# Patient Record
Sex: MALE | Race: WHITE | URBAN - METROPOLITAN AREA
[De-identification: names, ages, dates, MRNs, and addresses within clinical notes are randomized per-mention and may not be internally consistent; named-entity substitution may affect disease eponyms.]

---

## 2017-08-15 ENCOUNTER — RADIANT APPOINTMENT (OUTPATIENT)
Dept: GENERAL RADIOLOGY | Facility: CLINIC | Age: 27
End: 2017-08-15
Attending: PHYSICIAN ASSISTANT
Payer: COMMERCIAL

## 2017-08-15 ENCOUNTER — OFFICE VISIT (OUTPATIENT)
Dept: FAMILY MEDICINE | Facility: CLINIC | Age: 27
End: 2017-08-15
Payer: COMMERCIAL

## 2017-08-15 VITALS
TEMPERATURE: 97.6 F | HEART RATE: 64 BPM | OXYGEN SATURATION: 100 % | SYSTOLIC BLOOD PRESSURE: 128 MMHG | RESPIRATION RATE: 16 BRPM | HEIGHT: 76 IN | DIASTOLIC BLOOD PRESSURE: 70 MMHG | BODY MASS INDEX: 23.62 KG/M2 | WEIGHT: 194 LBS

## 2017-08-15 DIAGNOSIS — R23.8 PIMPLES: ICD-10-CM

## 2017-08-15 DIAGNOSIS — Z23 NEED FOR VACCINATION: ICD-10-CM

## 2017-08-15 DIAGNOSIS — L08.9 LOCAL INFECTION OF SKIN AND SUBCUTANEOUS TISSUE: ICD-10-CM

## 2017-08-15 DIAGNOSIS — S99.929A: ICD-10-CM

## 2017-08-15 DIAGNOSIS — S90.229A CONTUSION OF TOENAIL, UNSPECIFIED LATERALITY, INITIAL ENCOUNTER: ICD-10-CM

## 2017-08-15 DIAGNOSIS — S99.929A: Primary | ICD-10-CM

## 2017-08-15 PROCEDURE — 90471 IMMUNIZATION ADMIN: CPT | Performed by: PHYSICIAN ASSISTANT

## 2017-08-15 PROCEDURE — 90714 TD VACC NO PRESV 7 YRS+ IM: CPT | Performed by: PHYSICIAN ASSISTANT

## 2017-08-15 PROCEDURE — 99202 OFFICE O/P NEW SF 15 MIN: CPT | Mod: 25 | Performed by: PHYSICIAN ASSISTANT

## 2017-08-15 PROCEDURE — 73630 X-RAY EXAM OF FOOT: CPT | Mod: LT

## 2017-08-15 PROCEDURE — 11740 EVACUATION SUBUNGUAL HMTMA: CPT | Mod: 59 | Performed by: PHYSICIAN ASSISTANT

## 2017-08-15 RX ORDER — CEPHALEXIN 500 MG/1
500 CAPSULE ORAL 4 TIMES DAILY
Qty: 40 CAPSULE | Refills: 0 | Status: SHIPPED | OUTPATIENT
Start: 2017-08-15 | End: 2018-12-12

## 2017-08-15 RX ORDER — HYDROCODONE BITARTRATE AND ACETAMINOPHEN 5; 325 MG/1; MG/1
1 TABLET ORAL EVERY 6 HOURS PRN
Qty: 12 TABLET | Refills: 0 | Status: SHIPPED | OUTPATIENT
Start: 2017-08-15 | End: 2018-12-12

## 2017-08-15 NOTE — MR AVS SNAPSHOT
After Visit Summary   8/15/2017    Juan Robertson    MRN: [id number]           Patient Information     Date Of Birth          1990        Visit Information        Provider Department      8/15/2017 6:40 PM Fernanda Malcolm PA-C [facility] [place]        Today's Diagnoses     Toe trauma, unspecified laterality, initial encounter    -  1    Need for vaccination        Local infection of skin and subcutaneous tissue          Care Instructions      Cellulitis  Cellulitis is an infection of the deep layers of skin. A break in the skin, such as a cut or scratch, can let bacteria under the skin. If the bacteria get to deep layers of the skin, it can be serious. If not treated, cellulitis can get into the bloodstream and lymph nodes. The infection can then spread throughout the body. This causes serious illness.  Cellulitis causes the affected skin to become red, swollen, warm, and sore. The reddened areas have a visible border. An open sore may leak fluid (pus). You may have a fever, chills, and pain.  Cellulitis is treated with antibiotics taken for 7 to 10 days. An open sore may be cleaned and covered with cool wet gauze. Symptoms should get better 1 to 2 days after treatment is started. Make sure to take all the antibiotics for the full number of days until they are gone. Keep taking the medicine even if your symptoms go away.  Home care  Follow these tips:    Limit the use of the part of your body with cellulitis.     If the infection is on your leg, keep your leg raised while sitting. This will help to reduce swelling.    Take all of the antibiotic medicine exactly as directed until it is gone. Do not miss any doses, especially during the first 7 days. Don t stop taking the medicine when your symptoms get better.    Keep the affected area clean and dry.    Wash your hands with soap and warm water before and after touching your skin. Anyone else who touches your skin should also wash  "his or her hands. Don't share towels.  Follow-up care  Follow up with your healthcare provider, or as advised. If your infection does not go away on the first antibiotic, your healthcare provider will prescribe a different one.  When to seek medical advice  Call your healthcare provider right away if any of these occur:    Red areas that spread    Swelling or pain that gets worse    Fluid leaking from the skin (pus)    Fever higher of 100.4  F (38.0  C) or higher after 2 days on antibiotics  Date Last Reviewed: 9/1/2016 2000-2017 [employer]. [address] [place] [address]. All rights reserved. This information is not intended as a substitute for professional medical care. Always follow your healthcare professional's instructions.                Follow-ups after your visit        Who to contact     If you have questions or need follow up information about today's clinic visit or your schedule please contact [facility] [place] directly at [phone].  Normal or non-critical lab and imaging results will be communicated to you by SamEnricohart, letter or phone within 4 business days after the clinic has received the results. If you do not hear from us within 7 days, please contact the clinic through SamEnricohart or phone. If you have a critical or abnormal lab result, we will notify you by phone as soon as possible.  Submit refill requests through Heysan or call your pharmacy and they will forward the refill request to us. Please allow 3 business days for your refill to be completed.          Additional Information About Your Visit        Heysan Information     Heysan lets you send messages to your doctor, view your test results, renew your prescriptions, schedule appointments and more. To sign up, go to [email or web address] . Click on \"Log in\" on the left side of the screen, which will take you to the Welcome page. Then click on \"Sign up Now\" on the right side of the page.     You " "will be asked to enter the access code listed below, as well as some personal information. Please follow the directions to create your username and password.     Your access code is: [email or web address]  Expires: 2017  7:25 PM     Your access code will  in 90 days. If you need help or a new code, please call your [facility] clinic or [phone].        Care EveryWhere ID     This is your Care EveryWhere ID. This could be used by other organizations to access your [facility] medical records  [id number]        Your Vitals Were     Pulse Temperature Respirations Height Pulse Oximetry BMI (Body Mass Index)    64 97.6  F (36.4  C) 16 6' 4\" (1.93 m) 100% 23.61 kg/m2       Blood Pressure from Last 3 Encounters:   08/15/17 128/70    Weight from Last 3 Encounters:   08/15/17 194 lb (88 kg)              We Performed the Following     1st  Administration  [[id number]]     TD PRSERV FREE >=7 YRS ADS IM [38149]          Today's Medication Changes          These changes are accurate as of: 8/15/17  7:25 PM.  If you have any questions, ask your nurse or doctor.               Start taking these medicines.        Dose/Directions    cephALEXin 500 MG capsule   Commonly known as:  KEFLEX   Used for:  Local infection of skin and subcutaneous tissue   Started by:  Fernanda Malcolm PA-C        Dose:  500 mg   Take 1 capsule (500 mg) by mouth 4 times daily   Quantity:  40 capsule   Refills:  0       HYDROcodone-acetaminophen 5-325 MG per tablet   Commonly known as:  NORCO   Used for:  Toe trauma, unspecified laterality, initial encounter   Started by:  Fernanda Malcolm PA-C        Dose:  1 tablet   Take 1 tablet by mouth every 6 hours as needed for moderate to severe pain maximum 4 tablet(s) per day   Quantity:  12 tablet   Refills:  0            Where to get your medicines      These medications were sent to [facility]/pharmacy #[id number] - [place] - [address] [facility]  [address] [address]     Phone: "  [phone]     cephALEXin 500 MG capsule         Some of these will need a paper prescription and others can be bought over the counter.  Ask your nurse if you have questions.     Bring a paper prescription for each of these medications     HYDROcodone-acetaminophen 5-325 MG per tablet                Primary Care Provider Office Phone # Fax #    Fernanda Malcolm PA-C [phone]       [address]  [place] [address]        Equal Access to Services     [facility] DELGADO : Hadii aad ku hadasho Soomaali, waaxda luqadaha, qaybta kaalmada adeegyada, waxay idiin hayaan adeeg khararos lafabio . So [facility] [phone].    ATENCIÓN: Si habla español, tiene a goncalves disposición servicios gratuitos de asistencia lingüística. Llame al [phone].    We comply with applicable federal civil rights laws and [place] laws. We do not discriminate on the basis of race, color, national origin, age, disability sex, sexual orientation or gender identity.            Thank you!     Thank you for choosing [facility] [place]  for your care. Our goal is always to provide you with excellent care. Hearing back from our patients is one way we can continue to improve our services. Please take a few minutes to complete the written survey that you may receive in the mail after your visit with us. Thank you!             Your Updated Medication List - Protect others around you: Learn how to safely use, store and throw away your medicines at www.disposemymeds.org.          This list is accurate as of: 8/15/17  7:25 PM.  Always use your most recent med list.                   Brand Name Dispense Instructions for use Diagnosis    cephALEXin 500 MG capsule    KEFLEX    40 capsule    Take 1 capsule (500 mg) by mouth 4 times daily    Local infection of skin and subcutaneous tissue       HYDROcodone-acetaminophen 5-325 MG per tablet    NORCO    12 tablet    Take 1 tablet by mouth every 6 hours as needed for moderate  to severe pain maximum 4 tablet(s) per day    Toe trauma, unspecified laterality, initial encounter

## 2017-08-15 NOTE — PROGRESS NOTES
"Chief Complaint   Patient presents with     Toe Pain     Mouth Problem       Initial /70  Pulse 64  Temp 97.6  F (36.4  C)  Resp 16  Ht 6' 4\" (1.93 m)  Wt 194 lb (88 kg)  SpO2 100%  BMI 23.61 kg/m2 Estimated body mass index is 23.61 kg/(m^2) as calculated from the following:    Height as of this encounter: 6' 4\" (1.93 m).    Weight as of this encounter: 194 lb (88 kg).  Medication Reconciliation: complete. KRYSTEN Oneill LPN        SUBJECTIVE:                                                    Juan Robertson is a 26 year old male who presents to clinic today for the following health issues:      Concern - #1 Rt 3rd toe and Left great toe pain  Onset: at music festival this weekend feet stepped on several  times    Description:   Bruising/red/pain    Intensity: 10/10 throbbing    Progression of Symptoms:  worsening    #2 sores by mouth - gets often       -------------------------------------    Problem list and histories reviewed & adjusted, as indicated.  Additional history: as documented    There is no problem list on file for this patient.    No past surgical history on file.    Social History   Substance Use Topics     Smoking status: Never Smoker     Smokeless tobacco: Never Used     Alcohol use Yes      Comment: 10-15/ month     Family History   Problem Relation Age of Onset     Family History Negative Mother      Liver Disease Father      transplant     Heart Defect Father      valve         Current Outpatient Prescriptions   Medication Sig Dispense Refill     cephALEXin (KEFLEX) 500 MG capsule Take 1 capsule (500 mg) by mouth 4 times daily 40 capsule 0     HYDROcodone-acetaminophen (NORCO) 5-325 MG per tablet Take 1 tablet by mouth every 6 hours as needed for moderate to severe pain maximum 4 tablet(s) per day 12 tablet 0     No Known Allergies  Labs reviewed in EPIC      Reviewed and updated as needed this visit by clinical staffTobacco  Allergies  Meds  Fam Hx  Soc Hx      Reviewed and updated as " "needed this visit by Provider         Social History     Social History     Marital status: Single     Spouse name: single     Number of children: 0     Years of education: N/A     Occupational History           Social History Main Topics     Smoking status: Never Smoker     Smokeless tobacco: Never Used     Alcohol use Yes      Comment: 10-15/ month     Drug use: No     Sexual activity: Yes     Partners: Female     Other Topics Concern     None     Social History Narrative     None       10 point review of systems negative other than symptoms noted above.   Constitutional, HEENT, CV, pulmonary, GI, , MS, Endo, Psych systems are all negative, except as otherwise noted.       OBJECTIVE:  /70  Pulse 64  Temp 97.6  F (36.4  C)  Resp 16  Ht 6' 4\" (1.93 m)  Wt 194 lb (88 kg)  SpO2 100%  BMI 23.61 kg/m2  CONSTITUTIONAL: Alert, well-nourished, well-groomed, NAD  RESP: Lungs CTA. No wheeze, rhonchi, rales. Normal effort on room air. Equal lung sounds bilaterally.   CV: HRRR, normal S1, S2. No MRG. No peripheral edema.  DERM: small pustular pimple above left upper lip border   MUSCULOSKELETAL:   BILATERAL FOOT EXAM:  Appearance: Left great toenail with traumatic hematoma underneath and surrounding erythema extending slightly proximally.   Right 3rd toenail is displaced and with a traumatic hematoma and blister under nail and surrounding. Erythema extending same as above from this toe. Also some erythema on distal 2nd toe.   Palpation: No palpable abnormalities.  Tenderness: above toes over nails and around. None otherwise.   Movement: Full range of motion intact.  Circulation: Distal circulation intact, pulses palpable and good capillary refill.  Strength: difficult forced flexion and extension of Left toe #1 and Right toe #3  Sensation: Sensation grossly intact.    PROCEDURE:  Written consent obtained.   Toes and nails cleansed with alcohol prep.   Using cautery pen, small hole was made in top of " nail near proximal edge to drain blood and serous fluid. Able to drain quite a bit, patient tolerated well and started to feel better. Discussed home cares and signs of infection. See below.     Diagnostic Tests:  XR FOOT BILATERAL G/E 3 VW 8/15/2017 7:13 PM     COMPARISON: None.     HISTORY: Left great toe and right fourth toe trauma.     FINDINGS:  Right foot: No fractures are seen. Joints are preserved and in normal  alignment.     Left foot: No fractures are seen. Joints are preserved and in normal  alignment.         IMPRESSION: No acute bone abnormality or significant degenerative  change identified in either foot.    ASSESSMENT/PLAN:  (S99.929A) Toe trauma, unspecified laterality, initial encounter  (primary encounter diagnosis)  Comment: Fractures r/o'd.   Plan: XR Foot Bilateral G/E 3 Views,         HYDROcodone-acetaminophen (NORCO) 5-325 MG per         tablet, EVACUATION BLOOD FROM UNDER NAIL,         EVACUATION BLOOD FROM UNDER NAIL            (Z23) Need for vaccination  Comment:   Plan: TD PRSERV FREE >=7 YRS ADS IM [13179], 1st          Administration  [93778]            (L08.9) Local infection of skin and subcutaneous tissue  Comment:   Plan: cephALEXin (KEFLEX) 500 MG capsule            (S90.229A) Contusion of toenail, unspecified laterality, initial encounter  Comment:   Plan: EVACUATION BLOOD FROM UNDER NAIL, EVACUATION         BLOOD FROM UNDER NAIL            (R23.8) Pimples  Comment:   Plan: Not a cold sore. Recommend good hygiene cares.       FOLLOW-UP: Routinely and sooner as needed. Will call to check in, in a few days.  The patient agrees with this assessment and plan and agrees to call or return to the clinic with any questions or concerns or if their condition worsens.    KEENAN Ramires, PA-C  [facility]

## 2017-08-16 NOTE — PATIENT INSTRUCTIONS
Cellulitis  Cellulitis is an infection of the deep layers of skin. A break in the skin, such as a cut or scratch, can let bacteria under the skin. If the bacteria get to deep layers of the skin, it can be serious. If not treated, cellulitis can get into the bloodstream and lymph nodes. The infection can then spread throughout the body. This causes serious illness.  Cellulitis causes the affected skin to become red, swollen, warm, and sore. The reddened areas have a visible border. An open sore may leak fluid (pus). You may have a fever, chills, and pain.  Cellulitis is treated with antibiotics taken for 7 to 10 days. An open sore may be cleaned and covered with cool wet gauze. Symptoms should get better 1 to 2 days after treatment is started. Make sure to take all the antibiotics for the full number of days until they are gone. Keep taking the medicine even if your symptoms go away.  Home care  Follow these tips:    Limit the use of the part of your body with cellulitis.     If the infection is on your leg, keep your leg raised while sitting. This will help to reduce swelling.    Take all of the antibiotic medicine exactly as directed until it is gone. Do not miss any doses, especially during the first 7 days. Don t stop taking the medicine when your symptoms get better.    Keep the affected area clean and dry.    Wash your hands with soap and warm water before and after touching your skin. Anyone else who touches your skin should also wash his or her hands. Don't share towels.  Follow-up care  Follow up with your healthcare provider, or as advised. If your infection does not go away on the first antibiotic, your healthcare provider will prescribe a different one.  When to seek medical advice  Call your healthcare provider right away if any of these occur:    Red areas that spread    Swelling or pain that gets worse    Fluid leaking from the skin (pus)    Fever higher of 100.4  F (38.0  C) or higher after 2 days  on antibiotics  Date Last Reviewed: 9/1/2016 2000-2017 [employer]. [address] [place] [address], [place] [address]. All rights reserved. This information is not intended as a substitute for professional medical care. Always follow your healthcare professional's instructions.

## 2017-08-22 ENCOUNTER — TELEPHONE (OUTPATIENT)
Dept: FAMILY MEDICINE | Facility: CLINIC | Age: 27
End: 2017-08-22

## 2018-12-12 ENCOUNTER — OFFICE VISIT (OUTPATIENT)
Dept: INTERNAL MEDICINE | Facility: CLINIC | Age: 28
End: 2018-12-12
Payer: COMMERCIAL

## 2018-12-12 ENCOUNTER — TRANSFERRED RECORDS (OUTPATIENT)
Dept: HEALTH INFORMATION MANAGEMENT | Facility: CLINIC | Age: 28
End: 2018-12-12

## 2018-12-12 ENCOUNTER — TELEPHONE (OUTPATIENT)
Dept: INTERNAL MEDICINE | Facility: CLINIC | Age: 28
End: 2018-12-12

## 2018-12-12 VITALS
OXYGEN SATURATION: 98 % | RESPIRATION RATE: 18 BRPM | TEMPERATURE: 98.2 F | BODY MASS INDEX: 22.87 KG/M2 | DIASTOLIC BLOOD PRESSURE: 68 MMHG | SYSTOLIC BLOOD PRESSURE: 128 MMHG | HEIGHT: 76 IN | WEIGHT: 187.8 LBS | HEART RATE: 68 BPM

## 2018-12-12 DIAGNOSIS — K64.5 THROMBOSED EXTERNAL HEMORRHOIDS: Primary | ICD-10-CM

## 2018-12-12 PROCEDURE — 99213 OFFICE O/P EST LOW 20 MIN: CPT | Performed by: INTERNAL MEDICINE

## 2018-12-12 ASSESSMENT — MIFFLIN-ST. JEOR: SCORE: 1933.36

## 2018-12-12 NOTE — TELEPHONE ENCOUNTER
Called and LM on pts cell letting him know new referral was placed by Dr. Cameron to Colorectal Surgery in [place]. Informed pt of their phone number to schedule appt [phone]

## 2018-12-12 NOTE — PROGRESS NOTES
"  SUBJECTIVE:                                                      HPI: Juan Robertson is a pleasant 28 year old male who presents with concern hemorrhoid:    Noticed a painful bump protruding from the right side of his anus ~ 1 week ago. Stable in size since first noticing. Very painful to sit on, better when standing. Noticed a little blood on toilet paper with bowel movement today.    No abdominal pain, nausea, vomiting  No sonia hematochezia or melena.  No fevers or chills.  No anorexia, early satiety, night sweats, or unintentional weight loss.    Admits to poor diet (junk, process, and fast foods; minimal fiber intake) and irregular stools. Has a bowel movement every other day, which are often hard.    No active medical problems.    The medication, allergy, and problem lists have been reviewed and updated as appropriate.       OBJECTIVE:                                                      /68   Pulse 68   Temp 98.2  F (36.8  C) (Oral)   Resp 18   Ht 1.93 m (6' 4\")   Wt 85.2 kg (187 lb 12.8 oz)   SpO2 98%   BMI 22.86 kg/m    Constitutional: well-appearing  Genitourinary: moderate sized (1.5-2cm in diameter), exquisitely tender, thrombosed external hemorrhoid at 4 o'clock; patient cannot tolerate DIEGO due to pain associated with thrombosed hemorrhoid    ASSESSMENT/PLAN:                                                      (K64.5) Thrombosed external hemorrhoids  (primary encounter diagnosis)  Comment: moderate sized (1.5-2cm in diameter), at 4 o'clock.   Plan:    - Tylenol, ibuprofen, warm water baths, and warm damp compresses as needed for pain relief.   - referred to Colorectal surgery for further evaluation and possible treatment - patient to schedule.    The instructions on the AVS were discussed and explained to the patient. Patient expressed understanding of instructions.    (Chart documentation was completed, in part, with [employer] voice-recognition software. Even though reviewed, some " grammatical, spelling, and word errors may remain.)    Samaria Cameron MD   [address] [address]  T: [phone], F: [phone]

## 2018-12-12 NOTE — TELEPHONE ENCOUNTER
Reason for Call:  Other call back    Detailed comments: Patient called [facility] and told him they don't treat thrombosed hemorrhoids. Patient would like to get call back from nurse where he can go.    Phone Number Patient can be reached at: Cell number on file:    Telephone Information:   Mobile [phone]       Best Time: Anytime    Can we leave a detailed message on this number? YES    Call taken on 12/12/2018 at 11:27 AM by ROQUE SANCHEZ